# Patient Record
Sex: FEMALE | Race: OTHER | HISPANIC OR LATINO | Employment: UNEMPLOYED | URBAN - METROPOLITAN AREA
[De-identification: names, ages, dates, MRNs, and addresses within clinical notes are randomized per-mention and may not be internally consistent; named-entity substitution may affect disease eponyms.]

---

## 2020-12-02 ENCOUNTER — HOSPITAL ENCOUNTER (EMERGENCY)
Facility: HOSPITAL | Age: 60
Discharge: HOME | End: 2020-12-02
Attending: EMERGENCY MEDICINE
Payer: COMMERCIAL

## 2020-12-02 VITALS
DIASTOLIC BLOOD PRESSURE: 70 MMHG | RESPIRATION RATE: 21 BRPM | BODY MASS INDEX: 25.76 KG/M2 | HEIGHT: 62 IN | TEMPERATURE: 99 F | HEART RATE: 66 BPM | SYSTOLIC BLOOD PRESSURE: 97 MMHG | WEIGHT: 140 LBS | OXYGEN SATURATION: 96 %

## 2020-12-02 DIAGNOSIS — J18.9 PNEUMONIA OF BOTH LOWER LOBES DUE TO INFECTIOUS ORGANISM: Primary | ICD-10-CM

## 2020-12-02 DIAGNOSIS — R05.9 COUGH: ICD-10-CM

## 2020-12-02 LAB
ALBUMIN SERPL BCP-MCNC: 4.1 G/DL (ref 3.5–5.2)
ALP SERPL-CCNC: 60 U/L (ref 55–135)
ALT SERPL W/O P-5'-P-CCNC: 15 U/L (ref 10–44)
ANION GAP SERPL CALC-SCNC: 11 MMOL/L (ref 8–16)
AST SERPL-CCNC: 17 U/L (ref 10–40)
BASOPHILS # BLD AUTO: 0 K/UL (ref 0–0.2)
BASOPHILS NFR BLD: 0 % (ref 0–1.9)
BILIRUB SERPL-MCNC: 0.3 MG/DL (ref 0.1–1)
BILIRUB UR QL STRIP: NEGATIVE
BUN SERPL-MCNC: 6 MG/DL (ref 6–20)
CALCIUM SERPL-MCNC: 8.7 MG/DL (ref 8.7–10.5)
CHLORIDE SERPL-SCNC: 106 MMOL/L (ref 95–110)
CLARITY UR: CLEAR
CO2 SERPL-SCNC: 23 MMOL/L (ref 23–29)
COLOR UR: YELLOW
CREAT SERPL-MCNC: 0.7 MG/DL (ref 0.5–1.4)
CTP QC/QA: YES
CTP QC/QA: YES
DIFFERENTIAL METHOD: ABNORMAL
EOSINOPHIL # BLD AUTO: 0 K/UL (ref 0–0.5)
EOSINOPHIL NFR BLD: 0 % (ref 0–8)
ERYTHROCYTE [DISTWIDTH] IN BLOOD BY AUTOMATED COUNT: 12 % (ref 11.5–14.5)
EST. GFR  (AFRICAN AMERICAN): >60 ML/MIN/1.73 M^2
EST. GFR  (NON AFRICAN AMERICAN): >60 ML/MIN/1.73 M^2
GLUCOSE SERPL-MCNC: 102 MG/DL (ref 70–110)
GLUCOSE UR QL STRIP: NEGATIVE
HCT VFR BLD AUTO: 38.6 % (ref 37–48.5)
HGB BLD-MCNC: 12.9 G/DL (ref 12–16)
HGB UR QL STRIP: ABNORMAL
IMM GRANULOCYTES # BLD AUTO: 0.01 K/UL (ref 0–0.04)
IMM GRANULOCYTES NFR BLD AUTO: 0.3 % (ref 0–0.5)
KETONES UR QL STRIP: NEGATIVE
LACTATE SERPL-SCNC: 0.8 MMOL/L (ref 0.5–2.2)
LEUKOCYTE ESTERASE UR QL STRIP: NEGATIVE
LYMPHOCYTES # BLD AUTO: 0.7 K/UL (ref 1–4.8)
LYMPHOCYTES NFR BLD: 21.5 % (ref 18–48)
MCH RBC QN AUTO: 30 PG (ref 27–31)
MCHC RBC AUTO-ENTMCNC: 33.4 G/DL (ref 32–36)
MCV RBC AUTO: 90 FL (ref 82–98)
MONOCYTES # BLD AUTO: 0.4 K/UL (ref 0.3–1)
MONOCYTES NFR BLD: 11.6 % (ref 4–15)
NEUTROPHILS # BLD AUTO: 2.3 K/UL (ref 1.8–7.7)
NEUTROPHILS NFR BLD: 66.6 % (ref 38–73)
NITRITE UR QL STRIP: NEGATIVE
NRBC BLD-RTO: 0 /100 WBC
PH UR STRIP: 6 [PH] (ref 5–8)
PLATELET # BLD AUTO: 195 K/UL (ref 150–350)
PMV BLD AUTO: 9.9 FL (ref 9.2–12.9)
POC MOLECULAR INFLUENZA A AGN: NEGATIVE
POC MOLECULAR INFLUENZA B AGN: NEGATIVE
POTASSIUM SERPL-SCNC: 3.9 MMOL/L (ref 3.5–5.1)
PROT SERPL-MCNC: 7.2 G/DL (ref 6–8.4)
PROT UR QL STRIP: NEGATIVE
RBC # BLD AUTO: 4.3 M/UL (ref 4–5.4)
SARS-COV-2 RDRP RESP QL NAA+PROBE: NEGATIVE
SODIUM SERPL-SCNC: 140 MMOL/L (ref 136–145)
SP GR UR STRIP: 1.02 (ref 1–1.03)
TROPONIN I SERPL DL<=0.01 NG/ML-MCNC: 0.01 NG/ML (ref 0–0.03)
URN SPEC COLLECT METH UR: ABNORMAL
UROBILINOGEN UR STRIP-ACNC: NEGATIVE EU/DL
WBC # BLD AUTO: 3.44 K/UL (ref 3.9–12.7)

## 2020-12-02 PROCEDURE — 81003 URINALYSIS AUTO W/O SCOPE: CPT

## 2020-12-02 PROCEDURE — 93010 ELECTROCARDIOGRAM REPORT: CPT | Mod: ,,, | Performed by: INTERNAL MEDICINE

## 2020-12-02 PROCEDURE — 63700000 PHARM REV CODE 250 ALT 637 W/O HCPCS: Performed by: PHYSICIAN ASSISTANT

## 2020-12-02 PROCEDURE — 99285 EMERGENCY DEPT VISIT HI MDM: CPT | Mod: 25

## 2020-12-02 PROCEDURE — 83605 ASSAY OF LACTIC ACID: CPT

## 2020-12-02 PROCEDURE — 80053 COMPREHEN METABOLIC PANEL: CPT

## 2020-12-02 PROCEDURE — 25000003 PHARM REV CODE 250: Performed by: PHYSICIAN ASSISTANT

## 2020-12-02 PROCEDURE — U0002 COVID-19 LAB TEST NON-CDC: HCPCS | Performed by: EMERGENCY MEDICINE

## 2020-12-02 PROCEDURE — 93005 ELECTROCARDIOGRAM TRACING: CPT

## 2020-12-02 PROCEDURE — 93010 EKG 12-LEAD: ICD-10-PCS | Mod: ,,, | Performed by: INTERNAL MEDICINE

## 2020-12-02 PROCEDURE — 63600175 PHARM REV CODE 636 W HCPCS: Performed by: PHYSICIAN ASSISTANT

## 2020-12-02 PROCEDURE — 85025 COMPLETE CBC W/AUTO DIFF WBC: CPT

## 2020-12-02 PROCEDURE — 84484 ASSAY OF TROPONIN QUANT: CPT

## 2020-12-02 PROCEDURE — 87040 BLOOD CULTURE FOR BACTERIA: CPT

## 2020-12-02 PROCEDURE — 96365 THER/PROPH/DIAG IV INF INIT: CPT

## 2020-12-02 RX ORDER — ACETAMINOPHEN 325 MG/1
650 TABLET ORAL
Status: COMPLETED | OUTPATIENT
Start: 2020-12-02 | End: 2020-12-02

## 2020-12-02 RX ORDER — IBUPROFEN 600 MG/1
600 TABLET ORAL
Status: COMPLETED | OUTPATIENT
Start: 2020-12-02 | End: 2020-12-02

## 2020-12-02 RX ORDER — AZITHROMYCIN 250 MG/1
250 TABLET, FILM COATED ORAL DAILY
Qty: 6 TABLET | Refills: 0 | Status: SHIPPED | OUTPATIENT
Start: 2020-12-02

## 2020-12-02 RX ORDER — AZITHROMYCIN 250 MG/1
500 TABLET, FILM COATED ORAL
Status: COMPLETED | OUTPATIENT
Start: 2020-12-02 | End: 2020-12-02

## 2020-12-02 RX ADMIN — SODIUM CHLORIDE 1000 ML: 0.9 INJECTION, SOLUTION INTRAVENOUS at 05:12

## 2020-12-02 RX ADMIN — CEFTRIAXONE SODIUM 1 G: 1 INJECTION, POWDER, FOR SOLUTION INTRAMUSCULAR; INTRAVENOUS at 06:12

## 2020-12-02 RX ADMIN — AZITHROMYCIN MONOHYDRATE 500 MG: 250 TABLET ORAL at 05:12

## 2020-12-02 RX ADMIN — ACETAMINOPHEN 650 MG: 325 TABLET ORAL at 03:12

## 2020-12-02 RX ADMIN — IBUPROFEN 600 MG: 600 TABLET, FILM COATED ORAL at 05:12

## 2020-12-02 NOTE — ED NOTES
Pt care assumed.  Pt states that she left work last night and when she arrived to her car she had chills. She states that when she arrived home she checked her temperature and it was 102.2.  She said that since then her temp has been up and down and she has had back pain and temp since yesterday so she came into get a covid test.

## 2020-12-02 NOTE — FIRST PROVIDER EVALUATION
Emergency Department TeleTriage Encounter Note      CHIEF COMPLAINT    Chief Complaint   Patient presents with    Fever     t max 102.2 last night, took motrion with relief, + body aches, that began on mon, denies any sick contact, states went to [place] last weekend, denies n/v        VITAL SIGNS   Initial Vitals [12/02/20 1131]   BP Pulse Resp Temp SpO2   117/65 82 17 99.1 °F (37.3 °C) 97 %      MAP       --            ALLERGIES    Review of patient's allergies indicates:  No Known Allergies    PROVIDER TRIAGE NOTE  This is a teletriage evaluation of a 59 y.o. female presenting to the ED with c/o body aches and fever.  Requesting a COVID test.  Reports travel last weekend.  Reports no urinary symptoms. Initial orders will be placed and care will be transferred to an alternate provider when patient is roomed for a full evaluation. Any additional orders and the final disposition will be determined by that provider.         ORDERS  Labs Reviewed   SARS-COV-2 RDRP GENE       ED Orders (720h ago, onward)    Start Ordered     Status Ordering Provider    12/02/20 1141 12/02/20 1140  Airborne and Contact and Droplet Isolation Status  Continuous      Ordered CALVIN WAGNER    12/02/20 1135 12/02/20 1134  POCT COVID-19 Rapid Screening  Once      Ordered CATARINO KOTHARI            Virtual Visit Note: The provider triage portion of this emergency department evaluation and documentation was performed via [employer], a HIPAA-compliant telemedicine application, in concert with a tele-presenter in the room. A face to face patient evaluation with one of my colleagues will occur once the patient is placed in an emergency department room.      DISCLAIMER: This note was prepared with M*Scali voice recognition transcription software. Garbled syntax, mangled pronouns, and other bizarre constructions may be attributed to that software system.

## 2020-12-02 NOTE — ED PROVIDER NOTES
Encounter Date: 12/2/2020       History     Chief Complaint   Patient presents with    Fever     t max 102.2 last night, took motrion with relief, + body aches, that began on mon, denies any sick contact, states went to [place] last weekend, denies n/v      59-year-old female presents ED with concern of COVID.  No known exposure, but does admit to gathering with family members over Thanksgiving.  Patient states she has 2 day onset body aches, mild cough, intermittent headaches, and chills with subjective fever 101-102F controlled with home Tylenol.  She denies loss in taste or smell, shortness of breath, congestion, sore throat, chest pain, abdominal pain, diarrhea, nausea, vomiting.  She does report increased urine frequency, but no discomfort with urination, or changes in urine color.  She states she is eating and drinking well at home. No other acute complaints at this time.       The history is provided by the patient.     Review of patient's allergies indicates:  No Known Allergies  No past medical history on file.  No past surgical history on file.  No family history on file.  Social History     Tobacco Use    Smoking status: Not on file   Substance Use Topics    Alcohol use: Not on file    Drug use: Not on file     Review of Systems   Constitutional: Positive for fever. Negative for activity change and chills.   HENT: Negative for congestion and sore throat.    Eyes: Negative for visual disturbance.   Respiratory: Positive for cough. Negative for shortness of breath.    Cardiovascular: Negative for chest pain and leg swelling.   Gastrointestinal: Negative for abdominal pain, nausea and vomiting.   Genitourinary: Negative for difficulty urinating.   Musculoskeletal: Positive for back pain (chronic) and myalgias.   Neurological: Positive for headaches. Negative for dizziness, weakness and light-headedness.       Physical Exam     Initial Vitals [12/02/20 1131]   BP Pulse Resp Temp SpO2   117/65 82 17 99.1  °F (37.3 °C) 97 %      MAP       --         Physical Exam    Nursing note and vitals reviewed.  Constitutional: She appears well-developed and well-nourished. She is cooperative.  Non-toxic appearance. She does not have a sickly appearance. She does not appear ill. No distress.   HENT:   Head: Normocephalic and atraumatic.   Eyes: Conjunctivae and EOM are normal.   Neck: Normal range of motion. Neck supple.   Cardiovascular: Normal rate, regular rhythm and normal heart sounds.   Pulmonary/Chest: Effort normal and breath sounds normal.   Speaking in full sentences with no signs of respiratory distress.  Lungs clear to auscultation bilaterally.   Abdominal: Soft. Normal appearance. There is no abdominal tenderness.   No reproducible abdominal or suprapubic tenderness.  No guarding.  No masses.   Musculoskeletal: Tenderness present.      Comments: Mild tenderness noted throughout lumbar paraspinal region.  No midline spinous tenderness.  Full ROM and sensations into BLE   Neurological: She is alert and oriented to person, place, and time. GCS eye subscore is 4. GCS verbal subscore is 5. GCS motor subscore is 6.   Skin: Skin is warm and dry.   Psychiatric: She has a normal mood and affect. Her speech is normal and behavior is normal. Thought content normal.         ED Course   Procedures  Labs Reviewed   CBC W/ AUTO DIFFERENTIAL - Abnormal; Notable for the following components:       Result Value    WBC 3.44 (*)     Lymph # 0.7 (*)     All other components within normal limits   URINALYSIS, REFLEX TO URINE CULTURE - Abnormal; Notable for the following components:    Occult Blood UA Trace (*)     All other components within normal limits    Narrative:     Specimen Source->Urine   CULTURE, BLOOD   CULTURE, BLOOD   COMPREHENSIVE METABOLIC PANEL   LACTIC ACID, PLASMA   TROPONIN I   TROPONIN I   TROPONIN I    Narrative:     Troponin added per Billie Guevara RN at 19:30.    SARS-COV-2 RDRP GENE   POCT INFLUENZA A/B MOLECULAR           Imaging Results          X-Ray Chest 1 View (Final result)  Result time 12/02/20 16:31:47    Final result by Brandon Stone MD (12/02/20 16:31:47)                 Impression:      Ill-defined left lower lung zone airspace opacity, and bilateral lower lung zone interstitial opacities.  Findings are suspicious for edema or infectious/inflammatory disease.      Electronically signed by: Brandon Stone MD  Date:    12/02/2020  Time:    16:31             Narrative:    EXAMINATION:  XR CHEST 1 VIEW    CLINICAL HISTORY:  Cough    TECHNIQUE:  Single frontal view of the chest was performed.    COMPARISON:  None    FINDINGS:  Ill-defined left lower lung zone airspace opacity.  Bilateral lower lung zone interstitial opacities.    No effusion or pneumothorax.    No acute bone abnormality.                                 Medical Decision Making:   Differential Diagnosis:   COVID, URI, allergy/irritants, viral, influenza   ED Management:  COVID and flu testing, labs, trop, EKG, cultures, lactic, CXR, UA    Patient presents with concern of COVID but no known exposure, symptoms including intermittent headaches, mild cough, body aches, and subjective fever controlled with home Tylenol/ibuprofen.  Vitals WNL on arrival. On exam, patient is speaking in full sentences with no signs of respiratory distress.  COVID flu test negative.  Noted to develop fever during ED of 100.6. Tylenol given. CXR finding of bilateral lower lung zone interstitial opacities, concerning for onset of pneumonia. Lactic and BC obtained. Started on IVF, IV rocephin and azithromycin.   Labs grossly WNL. Lactic WNL. Trop neg. EKG NSR. UA neg.  Patient reported improvement of symptoms.  Fever improved.  Patient will be discharged home with [facility], encouraged to alternate Tylenol/ibuprofen as needed for discomfort/fevers.  High ED return precautions discussed.  Patient understands instructions and agrees with plan.  Other:   I have discussed this case  with another health care provider.       <> Summary of the Discussion: Patient was discussed with Dr. Lacey, who agrees with ED course and dispo                   ED Course as of Dec 02 2027   Wed Dec 02, 2020   1815 EKG interpretation by ED attending physician:  Normal sinus rhythm, rate 72, no ST changes or ischemia, normal intervals.  No prior for comparison.    [SS]      ED Course User Index  [SS] Alhaji Lacey MD            Clinical Impression:     ICD-10-CM ICD-9-CM   1. Pneumonia of both lower lobes due to infectious organism  J18.9 486   2. Cough  R05 786.2                          ED Disposition Condition    Discharge Stable        ED Prescriptions     Medication Sig Dispense Start Date End Date Auth. Provider    azithromycin (Z-NATHALIE) 250 MG tablet Take 1 tablet (250 mg total) by mouth once daily. Take first 2 tablets together, then 1 every day until finished. 6 tablet 12/2/2020  Dave Buenrostro PA-C        Follow-up Information     Follow up With Specialties Details Why Contact Info    Your Doctor  Call  As needed                                        Dave Buenrostro PA-C  12/02/20 2027

## 2020-12-03 ENCOUNTER — NURSE TRIAGE (OUTPATIENT)
Dept: ADMINISTRATIVE | Facility: CLINIC | Age: 60
End: 2020-12-03

## 2020-12-03 NOTE — ED NOTES
Dr. Lacey at bedside for reassessment and to discuss test results and discharge instructions. The patient states she is feeeling better than on arrival. Denies sob.

## 2020-12-03 NOTE — TELEPHONE ENCOUNTER
Dx with pneumonia in both lungs. Temp running 100.5-100.7 today. Was prescribed z pack and has been taking. Received rocephin iv in ED yesterday. No other new or worsening symptoms. No breathing concerns. Recommended care advice per protocol.    Reason for Disposition   [1] Taking antibiotics < 48 hours for pneumonia AND [2] fever persists    Additional Information   Negative: Severe difficulty breathing (e.g., struggling for each breath, speaks in single words)   Negative: Bluish (or gray) lips or face now   Negative: Difficult to awaken or acting confused (e.g., disoriented, slurred speech)   Negative: Stridor   Negative: Slow, shallow and weak breathing   Negative: Sounds like a life-threatening emergency to the triager   Negative: Recently discharged from hospital with diagnosis of pneumonia   Negative: New onset or worsening chest pain   Negative: MODERATE difficulty breathing (e.g., speaks in phrases, SOB even at rest, pulse 100-120)   Negative: Fever > 104 F (40 C)   Negative: [1] Taking antibiotic > 24 hours for pneumonia AND [2] fever > 103 F (39.4 C)   Negative: [1] Coughed up blood AND [2] large amount or blood clots (Exception: blood-tinged sputum)   Negative: Patient sounds very sick or weak to the triager   Negative: [1] Diabetes mellitus or weak immune system (e.g., HIV positive, cancer chemo, splenectomy, organ transplant, chronic steroids) AND [2] new onset of fever > 100.0 F (37.8 C)   Negative: [1] Taking antibiotic > 24 hours for pneumonia AND [2] breathing is worse   Negative: [1] Recent medical visit within 24 hours AND [2] symptoms worse (Exception: fever worse)   Negative: [1] Taking antibiotic > 24 hours for pneumonia AND [2] new onset of fever   Negative: [1] Taking antibiotic > 48 hours (2 days) for pneumonia AND [2] fever persists or recurs   Negative: [1] Taking antibiotic > 48 hours (2 days) for pneumonia AND [2] breathing not improved   Negative: [1] Viral  pneumonia (no antibiotic) AND [2] fever persists > 3 days or recurs   Negative: [1] Continuous (nonstop) coughing interferes with work or school AND [2] no improvement using cough treatment per protocol   Negative: Cough lasts > 3 weeks   Negative: [1] Completed antibiotic treatment AND [2] cough not improved   Negative: [1] Completed antibiotic treatment AND [2] breathing not back to normal   Negative: [1] Taking antibiotic < 24 hours for pneumonia AND [2] new onset of fever    Protocols used: PNEUMONIA ON ANTIBIOTIC FOLLOW-UP CALL-A-AH

## 2020-12-03 NOTE — DISCHARGE INSTRUCTIONS
Continue to monitor symptoms very closely, take medications as discussed, Tylenol/ibuprofen as needed for fever    Return to closest emergency department if symptoms do not improve, change, worsen, or for any new concerns.

## 2020-12-05 ENCOUNTER — OFFICE VISIT (OUTPATIENT)
Dept: URGENT CARE | Facility: CLINIC | Age: 60
End: 2020-12-05
Payer: COMMERCIAL

## 2020-12-05 VITALS
DIASTOLIC BLOOD PRESSURE: 77 MMHG | BODY MASS INDEX: 25.76 KG/M2 | RESPIRATION RATE: 16 BRPM | HEIGHT: 62 IN | OXYGEN SATURATION: 98 % | TEMPERATURE: 99 F | WEIGHT: 140 LBS | HEART RATE: 70 BPM | SYSTOLIC BLOOD PRESSURE: 116 MMHG

## 2020-12-05 DIAGNOSIS — R50.9 FEVER, UNSPECIFIED FEVER CAUSE: Primary | ICD-10-CM

## 2020-12-05 DIAGNOSIS — U07.1 COVID-19 VIRUS DETECTED: ICD-10-CM

## 2020-12-05 LAB
CTP QC/QA: YES
SARS-COV-2 RDRP RESP QL NAA+PROBE: POSITIVE

## 2020-12-05 PROCEDURE — 99213 OFFICE O/P EST LOW 20 MIN: CPT | Mod: S$GLB,,, | Performed by: FAMILY MEDICINE

## 2020-12-05 PROCEDURE — U0002: ICD-10-PCS | Mod: QW,S$GLB,, | Performed by: FAMILY MEDICINE

## 2020-12-05 PROCEDURE — 3008F BODY MASS INDEX DOCD: CPT | Mod: CPTII,S$GLB,, | Performed by: FAMILY MEDICINE

## 2020-12-05 PROCEDURE — 3008F PR BODY MASS INDEX (BMI) DOCUMENTED: ICD-10-PCS | Mod: CPTII,S$GLB,, | Performed by: FAMILY MEDICINE

## 2020-12-05 PROCEDURE — 99213 PR OFFICE/OUTPT VISIT, EST, LEVL III, 20-29 MIN: ICD-10-PCS | Mod: S$GLB,,, | Performed by: FAMILY MEDICINE

## 2020-12-05 PROCEDURE — U0002 COVID-19 LAB TEST NON-CDC: HCPCS | Mod: QW,S$GLB,, | Performed by: FAMILY MEDICINE

## 2020-12-05 RX ORDER — TRAZODONE HYDROCHLORIDE 50 MG/1
TABLET ORAL
COMMUNITY
Start: 2020-10-06

## 2020-12-05 RX ORDER — TRAMADOL HYDROCHLORIDE 50 MG/1
50 TABLET ORAL EVERY 6 HOURS PRN
COMMUNITY
Start: 2020-11-07

## 2020-12-05 RX ORDER — ERGOCALCIFEROL 1.25 MG/1
CAPSULE ORAL
COMMUNITY
Start: 2020-10-06

## 2020-12-05 RX ORDER — IBUPROFEN 800 MG/1
800 TABLET ORAL 3 TIMES DAILY
COMMUNITY
Start: 2020-11-05

## 2020-12-05 RX ORDER — LEVOTHYROXINE SODIUM 88 UG/1
88 TABLET ORAL DAILY
COMMUNITY
Start: 2020-10-06

## 2020-12-05 NOTE — PROGRESS NOTES
"Subjective:       Patient ID: Mary Smith is a 59 y.o. female.    Vitals:  height is 5' 2" (1.575 m) and weight is 63.5 kg (140 lb).     Chief Complaint: COVID-19 Concerns    Pt states she was dx with bilat pneumonia wednesday and her dr instructed her to come and get a covid test.      Constitution: Positive for fever. Negative for chills, sweating and fatigue.   HENT: Negative for ear pain, congestion, sinus pain, sinus pressure, sore throat and voice change.    Neck: Negative for painful lymph nodes.   Eyes: Negative for eye redness.   Respiratory: Negative for chest tightness, cough, sputum production, bloody sputum, COPD, shortness of breath, stridor, wheezing and asthma.    Gastrointestinal: Positive for diarrhea. Negative for nausea and vomiting.   Musculoskeletal: Negative for muscle ache.   Skin: Negative for rash.   Allergic/Immunologic: Negative for seasonal allergies and asthma.   Hematologic/Lymphatic: Negative for swollen lymph nodes.       Objective:      Physical Exam   Constitutional: She is oriented to person, place, and time. She appears well-developed. She is cooperative.  Non-toxic appearance. She does not appear ill. No distress.   HENT:   Head: Normocephalic and atraumatic.   Ears:   Right Ear: Hearing, tympanic membrane, external ear and ear canal normal.   Left Ear: Hearing, tympanic membrane, external ear and ear canal normal. Impacted cerumen: ys.  Nose: Nose normal. No mucosal edema, rhinorrhea or nasal deformity. No epistaxis. Right sinus exhibits no maxillary sinus tenderness and no frontal sinus tenderness. Left sinus exhibits no maxillary sinus tenderness and no frontal sinus tenderness.   Mouth/Throat: Uvula is midline, oropharynx is clear and moist and mucous membranes are normal. No trismus in the jaw. Normal dentition. No uvula swelling. No posterior oropharyngeal erythema.   Eyes: Conjunctivae and lids are normal. Right eye exhibits no discharge. Left eye exhibits no " discharge. No scleral icterus.   Neck: Trachea normal, normal range of motion, full passive range of motion without pain and phonation normal. Neck supple.   Cardiovascular: Normal rate, regular rhythm, normal heart sounds and normal pulses.   Pulmonary/Chest: Effort normal and breath sounds normal. No respiratory distress.   Abdominal: Soft. Normal appearance and bowel sounds are normal. She exhibits no distension, no pulsatile midline mass and no mass. There is no abdominal tenderness.   Musculoskeletal: Normal range of motion.         General: No deformity.   Neurological: She is alert and oriented to person, place, and time. She exhibits normal muscle tone. Coordination normal.   Skin: Skin is warm, dry, intact, not diaphoretic and not pale. Psychiatric: Her speech is normal and behavior is normal. Judgment and thought content normal.   Nursing note and vitals reviewed.        Assessment:       1. Fever, unspecified fever cause        Plan:         Fever, unspecified fever cause  -     POCT COVID-19 Rapid Screening          A chest x-ray done at the emergency room shows pneumonia she has finished the has finished the treatment of Zithromax her chest x-ray done at the ER was showing pneumonia at present lungs are all clear and she other than fever she looks okay so continue the same treatment plan continue quarantine for at least 7 days    Results for orders placed or performed in visit on 12/05/20   POCT COVID-19 Rapid Screening   Result Value Ref Range    POC Rapid COVID Positive (A) Negative     Acceptable Yes

## 2020-12-05 NOTE — PATIENT INSTRUCTIONS
[facility]  Prevenir la propagación del Coronoavirus 2019 en hogares y comunidades residenciales      Pasos preventivos para personas con COVID-19 o que se sospecha que están infectadas (incluidas personas bajo investigación) que no necesitan estar hospitalizadas y personas infectadas con COVID-19 que hayan estado hospitalizadas sury que se determinan medicamente estables para irse a casa.    Hernandez médico y el personal de sanidad pública evaluarán si usted puede ser tratado en casa.   Quédese en casa excepto para obtener cuidados médicos.   Sepárese de otras personas y animales en hernandez hogar.   Llame por teléfono antes de ir a lucía a hernandez médico.   Póngase freddy mascarilla.   Tápese al toser y estornudar.   Lávese las divya con frecuencia.   Evite compartir objetos personales en hernandez hogar.   Limpie todas las superficies a hernandez alcance todos los días.    Monitoree jovanna síntomas. Consiga ayuda médica si la enfermedad empeora (por ejemplo, dificultad para respirar). Antes de salir a buscar ayuda, llame a hernandez proveedor médico.    Si tiene freddy emergencia médica y necesita llamar al 911, notifique al personal que responda a hernandez llamada de que usted tiene, o está siendo evaluado para COVID-19. Si es posible, póngase freddy mascarilla antes de que la ayuda sanitaria llegue.   Dejar de hacer aislamiento en casa. Llame a hernandez médico para que le asesore sobre si puede dejar de hacer aislamiento en casa.    Precauciones recomendadas para miembros del mismo hogar, compañeros íntimos y cuidadores, fuera del ámbito médico, de un paciente sintomático confirmado positivo para COVID-19 o un paciente que está siendo investigado.  Miembros del mismo hogar, compañeros íntimos y cuidadores, fuera del ámbito médico, pueden jesús estado en contacto con freddy persona con síntomas confirmada positivo para COVID-19 o freddy persona bajo investigación. Personas con contacto cercano deberían monitorizar hernandez diana; deberían  llamar a hernandez proveedor médico inmediatamente si desarrollan síntomas que sugieren que puede jesús contraído COVID-19 (por ejemplo, fiebre, tos, falta de aliento).    Personas con contacto cercano deberían, además, seguir las siguientes recomendaciones:   Asegúrese de que usted puede entender y ayudar al paciente a seguir las instrucciones de hernandez proveedor médico, en relación con la medicación y el cuidado a seguir. Debería ayudar al paciente con jovanna necesidades básicas en casa y ayudar cuando sea necesario a hacer la compra, ir a recoger las recetas médicas y otras necesidades personales.   Monitorear los síntomas del paciente. Si el paciente empeora, llame a hernandez proveedor médico y dígale que el paciente santamaria sido confirmado positivo para COVID-19. [place] ayudará a la oficina de hernandez médico a yoandy las medidas adecuadas para evitar que otras personas en la oficina o en la gerald de espera se infecten. Pida al proveedor médico que llame al departamento de diana del estado para más instrucciones. Si el paciente tiene freddy emergencia médica y tiene que llamar al 911, informe al operador que responda a hernandez llamada de que el paciente tiene o está siendo evaluado para COVID-19.   Miembros del mismo hogar deberían quedarse en habitaciones separadas del paciente lo juan jose posible. Miembros del mismo hogar deberían utilizar otro dormitorio y cuarto de baño, si fuera posible.    Prohibir la visita de cualquier persona que no necesite estar en la casa.   Miembros del mismo hogar deberían ocuparse de las mascotas de la casa. No cuide de animales o mascotas mientras esté enfermo.   Asegúrese de que las áreas comunes de la casa tienen buena ventilación, shan aire acondicionado o freddy ventana que se pueda abrir si el clima lo permite.   Lávese las divya frecuentemente. Lávese las divya frecuentemente con jabón y agua harriett al menos 20 segundos o utilice un desinfectante de divya con base de alcohol, que contenga entre 60 y 95% de  alcohol. Cubriendo todas las superficies de las divya y frotándolas juntas hasta que se sequen.   Evite tocarse los ojos, la nariz y la boca antes de haberse lavado las divya.   El paciente debería llevar mascarilla cuando esté con otras personas. Si el paciente no se puede poner freddy mascarilla porque, por ejemplo, tiene dificultad para respirar, usted, shan cuidador, debería ponerse freddy mascarilla cuando esté en la misma habitación que el paciente.   Utilice freddy mascarilla desechable y guantes cuando toque o esté en contacto con la caitlyn del paciente, jovanna heces, jovanna fluidos corporales tales shan saliva, esputo, mucosidad nasal, vómito, orina.   o Tire las mascarillas desechables y los guantes sage pues de utilizarlos. No los reutilice.  o Cuando se quite la protección, trey quítese los guantes. Inmediatamente después lávese las divya con agua y jabón o con un desinfectante de divya con base de alcohol. Después quítese y tire la mascarilla, e inmediatamente después lávese las divya otra vez con agua y jabón o utilice un desinfectante de divya con base de alcohol.   Evite compartir objetos del hogar con el paciente. No debería compartir platos, vasos, tazas, cubiertos, toallas, ropa de cama u otros objetos. Después de que el paciente utilice estos objetos debe lavarlos minuciosamente (shira debajo Timothy la colada minuciosamente).   Limpie todas las superficies de fácil alcance, shan las encimeras, las mesas, los pomos de las liana, los picaportes del baño, el retrete, teléfonos, teclados, tabletas y las mesillas de noche, todos los días. Además, lave cualquier superficie que pueda tener caitlyn, heces o fluidos corporales.   Utilice los productos de limpieza o las toallitas según indiquen las etiquetas de los mismos. Las etiquetas contienen la información para utilizar estos productos de manera parra y eficiente, además de las precauciones que debe yoandy al utilizar dichos productos, tales shan el uso de  guantes o buena ventilación.   Lave la colada minuciosamente.  o Retire inmediatamente cualquier prenda o ropa de cama que tenga caitlyn, heces o fluidos corporales.  o Utilice guantes desechables cuando toque objetos sucios y separe los objetos sucios de hernandez cuerpo. Lávese las divya (con jabón y agua o con un desinfectante de divya con base de alcohol) inmediatamente después de quitarse los guantes.  o Sosa y siga las instrucciones en las etiquetas de la ropa y el detergente. En general, utilice un detergente corriente siguiendo las instrucciones de la lavadora y séquelo meticulosamente utilizando la temperatura mas joan recomendada en la etiqueta de la ropa.   Coloque todos los guantes desechables, las mascarillas y otros objetos contaminados en un contenedor reforzado antes de tirarlo junto con otros desechos del hogar. Lávese las divya (con jabón y agua o con un desinfectante de divya con base de alcohol) inmediatamente después de tocar estos objetos. Si las divya están visiblemente sucias se recomienda lavarlas con agua y con jabón.    Consulte cualquier otra pregunta con hernandez departamento de diana local o estatal o con hernandez proveedor médico. Compruebe las horas en las que se puede contactar al departamento de diana local.    Para más información, siga el enlace del CDC que encontrará a continuación.    https://www.cdc.gov/coronavirus/2019-ncov/hcp/guidance-prevent-spread-sp.html

## 2020-12-06 ENCOUNTER — NURSE TRIAGE (OUTPATIENT)
Dept: ADMINISTRATIVE | Facility: CLINIC | Age: 60
End: 2020-12-06

## 2020-12-06 NOTE — TELEPHONE ENCOUNTER
Patient was returning call from a text that she received inquiring if she had symptoms from covid. Patient reports she is feeling fine, has some coughing and mild fever and that her pcp has her on a medication regimen. Patient was advised to call back with questions, concerns or symptoms. Patient verbalized understanding.

## 2020-12-06 NOTE — TELEPHONE ENCOUNTER
Covid-19 symptom tracker call back attempted. No contact made, left VM message. Will retry in 15 mins.    Second call back, patient has already spoken to another triage nurse.     Reason for Disposition   Message left on identified voice mail    Additional Information   Negative: Caller is angry or rude (e.g., hangs up, verbally abusive, yelling)   Negative: Caller hangs up   Negative: Caller has already spoken with the PCP and has no further questions.   Negative: Caller has already spoken with another triager and has no further questions.   Negative: Caller has already spoken with another triager or PCP AND has further questions AND triager able to answer questions.   Negative: Busy signal.  First attempt to contact caller.  Follow-up call scheduled within 15 minutes.   Negative: No answer.  First attempt to contact caller.  Follow-up call scheduled within 15 minutes.    Protocols used: NO CONTACT OR DUPLICATE CONTACT CALL-A-

## 2020-12-07 LAB
BACTERIA BLD CULT: NORMAL
BACTERIA BLD CULT: NORMAL

## 2024-09-11 ENCOUNTER — HOSPITAL ENCOUNTER (EMERGENCY)
Facility: HOSPITAL | Age: 64
Discharge: HOME | End: 2024-09-11
Attending: EMERGENCY MEDICINE
Payer: COMMERCIAL

## 2024-09-11 VITALS
SYSTOLIC BLOOD PRESSURE: 120 MMHG | HEIGHT: 62 IN | OXYGEN SATURATION: 96 % | WEIGHT: 147 LBS | TEMPERATURE: 98 F | RESPIRATION RATE: 17 BRPM | BODY MASS INDEX: 27.05 KG/M2 | HEART RATE: 52 BPM | DIASTOLIC BLOOD PRESSURE: 58 MMHG

## 2024-09-11 DIAGNOSIS — S20.212A CONTUSION OF RIB ON LEFT SIDE, INITIAL ENCOUNTER: ICD-10-CM

## 2024-09-11 DIAGNOSIS — R07.81 RIB PAIN ON LEFT SIDE: Primary | ICD-10-CM

## 2024-09-11 PROCEDURE — 63600175 PHARM REV CODE 636 W HCPCS: Performed by: EMERGENCY MEDICINE

## 2024-09-11 PROCEDURE — 99285 EMERGENCY DEPT VISIT HI MDM: CPT | Mod: 25

## 2024-09-11 PROCEDURE — 25000003 PHARM REV CODE 250: Performed by: EMERGENCY MEDICINE

## 2024-09-11 PROCEDURE — 96372 THER/PROPH/DIAG INJ SC/IM: CPT | Performed by: EMERGENCY MEDICINE

## 2024-09-11 RX ORDER — KETOROLAC TROMETHAMINE 10 MG/1
10 TABLET, FILM COATED ORAL EVERY 6 HOURS
Qty: 12 TABLET | Refills: 0 | Status: SHIPPED | OUTPATIENT
Start: 2024-09-11 | End: 2024-09-14

## 2024-09-11 RX ORDER — LIDOCAINE 50 MG/G
1 PATCH TOPICAL DAILY
Qty: 5 PATCH | Refills: 0 | Status: SHIPPED | OUTPATIENT
Start: 2024-09-11 | End: 2024-09-16

## 2024-09-11 RX ORDER — DEXAMETHASONE SODIUM PHOSPHATE 4 MG/ML
8 INJECTION, SOLUTION INTRA-ARTICULAR; INTRALESIONAL; INTRAMUSCULAR; INTRAVENOUS; SOFT TISSUE
Status: COMPLETED | OUTPATIENT
Start: 2024-09-11 | End: 2024-09-11

## 2024-09-11 RX ORDER — METHOCARBAMOL 500 MG/1
1000 TABLET, FILM COATED ORAL 3 TIMES DAILY PRN
Qty: 30 TABLET | Refills: 0 | Status: SHIPPED | OUTPATIENT
Start: 2024-09-11 | End: 2024-09-16

## 2024-09-11 RX ORDER — LIDOCAINE 50 MG/G
1 PATCH TOPICAL
Status: DISCONTINUED | OUTPATIENT
Start: 2024-09-11 | End: 2024-09-11 | Stop reason: HOSPADM

## 2024-09-11 RX ADMIN — DEXAMETHASONE SODIUM PHOSPHATE 8 MG: 4 INJECTION, SOLUTION INTRA-ARTICULAR; INTRALESIONAL; INTRAMUSCULAR; INTRAVENOUS; SOFT TISSUE at 03:09

## 2024-09-11 RX ADMIN — LIDOCAINE 1 PATCH: 50 PATCH CUTANEOUS at 01:09

## 2024-09-11 NOTE — ED NOTES
While being triaged, pt continued to note LUQ ABD pain, on physical exam, pain is to lower ribs on Left.
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
